# Patient Record
Sex: FEMALE | Race: WHITE | NOT HISPANIC OR LATINO | Employment: UNEMPLOYED | URBAN - METROPOLITAN AREA
[De-identification: names, ages, dates, MRNs, and addresses within clinical notes are randomized per-mention and may not be internally consistent; named-entity substitution may affect disease eponyms.]

---

## 2019-11-02 ENCOUNTER — HOSPITAL ENCOUNTER (EMERGENCY)
Facility: HOSPITAL | Age: 29
Discharge: HOME | End: 2019-11-02
Attending: EMERGENCY MEDICINE
Payer: MEDICAID

## 2019-11-02 VITALS
HEIGHT: 62 IN | SYSTOLIC BLOOD PRESSURE: 99 MMHG | RESPIRATION RATE: 18 BRPM | TEMPERATURE: 99 F | HEART RATE: 91 BPM | OXYGEN SATURATION: 99 % | WEIGHT: 147 LBS | BODY MASS INDEX: 27.05 KG/M2 | DIASTOLIC BLOOD PRESSURE: 64 MMHG

## 2019-11-02 DIAGNOSIS — S90.31XA CONTUSION OF RIGHT FOOT, INITIAL ENCOUNTER: Primary | ICD-10-CM

## 2019-11-02 DIAGNOSIS — R52 PAIN: ICD-10-CM

## 2019-11-02 DIAGNOSIS — S93.401A SPRAIN OF RIGHT ANKLE, UNSPECIFIED LIGAMENT, INITIAL ENCOUNTER: ICD-10-CM

## 2019-11-02 LAB
B-HCG UR QL: NEGATIVE
CTP QC/QA: YES

## 2019-11-02 PROCEDURE — 81025 URINE PREGNANCY TEST: CPT | Mod: ER | Performed by: EMERGENCY MEDICINE

## 2019-11-02 PROCEDURE — 25000003 PHARM REV CODE 250: Mod: ER | Performed by: EMERGENCY MEDICINE

## 2019-11-02 PROCEDURE — 99284 EMERGENCY DEPT VISIT MOD MDM: CPT | Mod: 25,ER

## 2019-11-02 RX ORDER — IBUPROFEN 600 MG/1
600 TABLET ORAL EVERY 6 HOURS PRN
Qty: 24 TABLET | Refills: 0 | Status: ON HOLD | OUTPATIENT
Start: 2019-11-02 | End: 2020-06-22

## 2019-11-02 RX ORDER — ACETAMINOPHEN 325 MG/1
650 TABLET ORAL
Status: COMPLETED | OUTPATIENT
Start: 2019-11-02 | End: 2019-11-02

## 2019-11-02 RX ADMIN — ACETAMINOPHEN 650 MG: 325 TABLET, FILM COATED ORAL at 11:11
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
## 2020-06-21 ENCOUNTER — HOSPITAL ENCOUNTER (EMERGENCY)
Facility: HOSPITAL | Age: 30
Discharge: PSYCHIATRIC OR SUBSTANCE USE FACILITY | End: 2020-06-21
Attending: EMERGENCY MEDICINE
Payer: MEDICAID

## 2020-06-21 ENCOUNTER — HOSPITAL ENCOUNTER (INPATIENT)
Facility: HOSPITAL | Age: 30
LOS: 3 days | Discharge: HOME | DRG: 885 | End: 2020-06-24
Attending: PSYCHIATRY & NEUROLOGY | Admitting: PSYCHIATRY & NEUROLOGY
Payer: MEDICAID

## 2020-06-21 VITALS
SYSTOLIC BLOOD PRESSURE: 130 MMHG | OXYGEN SATURATION: 100 % | BODY MASS INDEX: 27.6 KG/M2 | HEIGHT: 62 IN | TEMPERATURE: 98 F | HEART RATE: 100 BPM | DIASTOLIC BLOOD PRESSURE: 64 MMHG | WEIGHT: 150 LBS | RESPIRATION RATE: 17 BRPM

## 2020-06-21 DIAGNOSIS — F32.A DEPRESSION WITH SUICIDAL IDEATION: ICD-10-CM

## 2020-06-21 DIAGNOSIS — F43.10 PTSD (POST-TRAUMATIC STRESS DISORDER): ICD-10-CM

## 2020-06-21 DIAGNOSIS — Z72.89 DELIBERATE SELF-CUTTING: ICD-10-CM

## 2020-06-21 DIAGNOSIS — F33.2 MDD (MAJOR DEPRESSIVE DISORDER), RECURRENT SEVERE, WITHOUT PSYCHOSIS: Primary | ICD-10-CM

## 2020-06-21 DIAGNOSIS — R45.851 DEPRESSION WITH SUICIDAL IDEATION: ICD-10-CM

## 2020-06-21 DIAGNOSIS — T14.8XXA ABRASION: ICD-10-CM

## 2020-06-21 LAB
ALBUMIN SERPL BCP-MCNC: 4.3 G/DL (ref 3.5–5.2)
ALP SERPL-CCNC: 71 U/L (ref 55–135)
ALT SERPL W/O P-5'-P-CCNC: 14 U/L (ref 10–44)
AMPHET+METHAMPHET UR QL: NEGATIVE
ANION GAP SERPL CALC-SCNC: 11 MMOL/L (ref 8–16)
APAP SERPL-MCNC: <3 UG/ML (ref 10–20)
AST SERPL-CCNC: 18 U/L (ref 10–40)
B-HCG UR QL: NEGATIVE
BARBITURATES UR QL SCN>200 NG/ML: NEGATIVE
BASOPHILS # BLD AUTO: 0.02 K/UL (ref 0–0.2)
BASOPHILS NFR BLD: 0.2 % (ref 0–1.9)
BENZODIAZ UR QL SCN>200 NG/ML: NEGATIVE
BILIRUB SERPL-MCNC: 0.4 MG/DL (ref 0.1–1)
BILIRUB UR QL STRIP: NEGATIVE
BUN SERPL-MCNC: 17 MG/DL (ref 6–20)
BZE UR QL SCN: NEGATIVE
CALCIUM SERPL-MCNC: 9.7 MG/DL (ref 8.7–10.5)
CANNABINOIDS UR QL SCN: NEGATIVE
CHLORIDE SERPL-SCNC: 107 MMOL/L (ref 95–110)
CLARITY UR: ABNORMAL
CO2 SERPL-SCNC: 23 MMOL/L (ref 23–29)
COLOR UR: YELLOW
CREAT SERPL-MCNC: 0.7 MG/DL (ref 0.5–1.4)
CREAT UR-MCNC: 167.9 MG/DL (ref 15–325)
CTP QC/QA: YES
DIFFERENTIAL METHOD: ABNORMAL
EOSINOPHIL # BLD AUTO: 0 K/UL (ref 0–0.5)
EOSINOPHIL NFR BLD: 0.1 % (ref 0–8)
ERYTHROCYTE [DISTWIDTH] IN BLOOD BY AUTOMATED COUNT: 13.2 % (ref 11.5–14.5)
EST. GFR  (AFRICAN AMERICAN): >60 ML/MIN/1.73 M^2
EST. GFR  (NON AFRICAN AMERICAN): >60 ML/MIN/1.73 M^2
ETHANOL SERPL-MCNC: <10 MG/DL
GLUCOSE SERPL-MCNC: 91 MG/DL (ref 70–110)
GLUCOSE UR QL STRIP: NEGATIVE
HCT VFR BLD AUTO: 42.5 % (ref 37–48.5)
HGB BLD-MCNC: 13.7 G/DL (ref 12–16)
HGB UR QL STRIP: ABNORMAL
IMM GRANULOCYTES # BLD AUTO: 0.04 K/UL (ref 0–0.04)
IMM GRANULOCYTES NFR BLD AUTO: 0.4 % (ref 0–0.5)
KETONES UR QL STRIP: NEGATIVE
LEUKOCYTE ESTERASE UR QL STRIP: NEGATIVE
LYMPHOCYTES # BLD AUTO: 0.7 K/UL (ref 1–4.8)
LYMPHOCYTES NFR BLD: 7.7 % (ref 18–48)
MCH RBC QN AUTO: 29.5 PG (ref 27–31)
MCHC RBC AUTO-ENTMCNC: 32.2 G/DL (ref 32–36)
MCV RBC AUTO: 92 FL (ref 82–98)
METHADONE UR QL SCN>300 NG/ML: NEGATIVE
MICROSCOPIC COMMENT: ABNORMAL
MONOCYTES # BLD AUTO: 0.6 K/UL (ref 0.3–1)
MONOCYTES NFR BLD: 6.1 % (ref 4–15)
NEUTROPHILS # BLD AUTO: 7.9 K/UL (ref 1.8–7.7)
NEUTROPHILS NFR BLD: 85.5 % (ref 38–73)
NITRITE UR QL STRIP: NEGATIVE
NRBC BLD-RTO: 0 /100 WBC
OPIATES UR QL SCN: NEGATIVE
PCP UR QL SCN>25 NG/ML: NEGATIVE
PH UR STRIP: 6 [PH] (ref 5–8)
PLATELET # BLD AUTO: 206 K/UL (ref 150–350)
PMV BLD AUTO: 10.8 FL (ref 9.2–12.9)
POTASSIUM SERPL-SCNC: 4.3 MMOL/L (ref 3.5–5.1)
PROT SERPL-MCNC: 7.8 G/DL (ref 6–8.4)
PROT UR QL STRIP: NEGATIVE
RBC # BLD AUTO: 4.64 M/UL (ref 4–5.4)
RBC #/AREA URNS HPF: 5 /HPF (ref 0–4)
SARS-COV-2 RDRP RESP QL NAA+PROBE: NEGATIVE
SODIUM SERPL-SCNC: 141 MMOL/L (ref 136–145)
SP GR UR STRIP: >=1.03 (ref 1–1.03)
TOXICOLOGY INFORMATION: NORMAL
TSH SERPL DL<=0.005 MIU/L-ACNC: 1.05 UIU/ML (ref 0.4–4)
URN SPEC COLLECT METH UR: ABNORMAL
UROBILINOGEN UR STRIP-ACNC: NEGATIVE EU/DL
WBC # BLD AUTO: 9.19 K/UL (ref 3.9–12.7)
WBC #/AREA URNS HPF: 8 /HPF (ref 0–5)

## 2020-06-21 PROCEDURE — 25000003 PHARM REV CODE 250: Performed by: PSYCHIATRY & NEUROLOGY

## 2020-06-21 PROCEDURE — 99202 PR OFFICE/OUTPT VISIT, NEW, LEVL II, 15-29 MIN: ICD-10-PCS | Mod: 95,,, | Performed by: NURSE PRACTITIONER

## 2020-06-21 PROCEDURE — 84443 ASSAY THYROID STIM HORMONE: CPT

## 2020-06-21 PROCEDURE — 80307 DRUG TEST PRSMV CHEM ANLYZR: CPT

## 2020-06-21 PROCEDURE — 99285 EMERGENCY DEPT VISIT HI MDM: CPT | Mod: 25

## 2020-06-21 PROCEDURE — 85025 COMPLETE CBC W/AUTO DIFF WBC: CPT

## 2020-06-21 PROCEDURE — 11400000 HC PSYCH PRIVATE ROOM

## 2020-06-21 PROCEDURE — 25000003 PHARM REV CODE 250: Performed by: EMERGENCY MEDICINE

## 2020-06-21 PROCEDURE — 80053 COMPREHEN METABOLIC PANEL: CPT

## 2020-06-21 PROCEDURE — 80329 ANALGESICS NON-OPIOID 1 OR 2: CPT

## 2020-06-21 PROCEDURE — U0002 COVID-19 LAB TEST NON-CDC: HCPCS

## 2020-06-21 PROCEDURE — 81025 URINE PREGNANCY TEST: CPT | Performed by: EMERGENCY MEDICINE

## 2020-06-21 PROCEDURE — 99202 OFFICE O/P NEW SF 15 MIN: CPT | Mod: 95,,, | Performed by: NURSE PRACTITIONER

## 2020-06-21 PROCEDURE — 80320 DRUG SCREEN QUANTALCOHOLS: CPT

## 2020-06-21 PROCEDURE — 81000 URINALYSIS NONAUTO W/SCOPE: CPT | Mod: 59

## 2020-06-21 RX ORDER — ACETAMINOPHEN 325 MG/1
650 TABLET ORAL EVERY 6 HOURS PRN
Status: DISCONTINUED | OUTPATIENT
Start: 2020-06-21 | End: 2020-06-24 | Stop reason: HOSPADM

## 2020-06-21 RX ORDER — LOPERAMIDE HYDROCHLORIDE 2 MG/1
2 CAPSULE ORAL
Status: DISCONTINUED | OUTPATIENT
Start: 2020-06-21 | End: 2020-06-24 | Stop reason: HOSPADM

## 2020-06-21 RX ORDER — HYDROXYZINE PAMOATE 50 MG/1
50 CAPSULE ORAL NIGHTLY PRN
Status: DISCONTINUED | OUTPATIENT
Start: 2020-06-21 | End: 2020-06-24 | Stop reason: HOSPADM

## 2020-06-21 RX ORDER — OLANZAPINE 10 MG/1
10 TABLET ORAL EVERY 4 HOURS PRN
Status: DISCONTINUED | OUTPATIENT
Start: 2020-06-21 | End: 2020-06-24 | Stop reason: HOSPADM

## 2020-06-21 RX ORDER — MAG HYDROX/ALUMINUM HYD/SIMETH 200-200-20
30 SUSPENSION, ORAL (FINAL DOSE FORM) ORAL EVERY 6 HOURS PRN
Status: DISCONTINUED | OUTPATIENT
Start: 2020-06-21 | End: 2020-06-24 | Stop reason: HOSPADM

## 2020-06-21 RX ORDER — OLANZAPINE 10 MG/2ML
10 INJECTION, POWDER, FOR SOLUTION INTRAMUSCULAR EVERY 4 HOURS PRN
Status: DISCONTINUED | OUTPATIENT
Start: 2020-06-21 | End: 2020-06-24 | Stop reason: HOSPADM

## 2020-06-21 RX ORDER — FOLIC ACID 1 MG/1
1 TABLET ORAL DAILY
Status: DISCONTINUED | OUTPATIENT
Start: 2020-06-22 | End: 2020-06-24 | Stop reason: HOSPADM

## 2020-06-21 RX ORDER — DOCUSATE SODIUM 100 MG/1
100 CAPSULE, LIQUID FILLED ORAL DAILY PRN
Status: DISCONTINUED | OUTPATIENT
Start: 2020-06-21 | End: 2020-06-24 | Stop reason: HOSPADM

## 2020-06-21 RX ADMIN — HYDROXYZINE PAMOATE 50 MG: 50 CAPSULE ORAL at 10:06

## 2020-06-21 RX ADMIN — NEOMYCIN-BACITRACIN-POLYMYXIN OINT 1 EACH: OINTMENT at 06:06

## 2020-06-21 RX ADMIN — ACETAMINOPHEN 650 MG: 325 TABLET ORAL at 10:06

## 2020-06-21 NOTE — ED NOTES
Pt refuses diptheria-tetanus injection; states she believes she has had one in the last 5 years. Medication returned in pyxis.
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
## 2020-06-21 NOTE — ED NOTES
Security at bedside to wand patient. Pt changed into paper scrubs. Belongings secured and labeled by SHARITA Adam.

## 2020-06-21 NOTE — ED NOTES
Consult with Dr. Christofer jimenez. Dr. Beaulieu states he feels the patient can be discharged home, as long as her boyfriend is ok with this discharge plan. Pt gave Dr. Beaulieu her boyfriend's phone number to call.

## 2020-06-21 NOTE — ED NOTES
Pt informed that PEC has been written and the providers say that she needs to stay for inpatient treatment. Pt upset, says she doesn't need to stay. Dr. Christianson now at bedside discussing this plan with patient again.

## 2020-06-22 PROBLEM — F43.10 PTSD (POST-TRAUMATIC STRESS DISORDER): Status: ACTIVE | Noted: 2020-06-22

## 2020-06-22 PROBLEM — F33.2 MDD (MAJOR DEPRESSIVE DISORDER), RECURRENT SEVERE, WITHOUT PSYCHOSIS: Status: ACTIVE | Noted: 2020-06-22

## 2020-06-22 PROBLEM — T14.8XXA ABRASION: Status: ACTIVE | Noted: 2020-06-22

## 2020-06-22 LAB
CHOLEST SERPL-MCNC: 184 MG/DL (ref 120–199)
CHOLEST/HDLC SERPL: 3 {RATIO} (ref 2–5)
ESTIMATED AVG GLUCOSE: 85 MG/DL (ref 68–131)
HBA1C MFR BLD HPLC: 4.6 % (ref 4–5.6)
HDLC SERPL-MCNC: 61 MG/DL (ref 40–75)
HDLC SERPL: 33.2 % (ref 20–50)
LDLC SERPL CALC-MCNC: 113.4 MG/DL (ref 63–159)
NONHDLC SERPL-MCNC: 123 MG/DL
TRIGL SERPL-MCNC: 48 MG/DL (ref 30–150)

## 2020-06-22 PROCEDURE — 90833 PSYTX W PT W E/M 30 MIN: CPT | Mod: ,,, | Performed by: PSYCHIATRY & NEUROLOGY

## 2020-06-22 PROCEDURE — 99231 SBSQ HOSP IP/OBS SF/LOW 25: CPT | Mod: ,,, | Performed by: NURSE PRACTITIONER

## 2020-06-22 PROCEDURE — 99223 PR INITIAL HOSPITAL CARE,LEVL III: ICD-10-PCS | Mod: ,,, | Performed by: PSYCHIATRY & NEUROLOGY

## 2020-06-22 PROCEDURE — 80061 LIPID PANEL: CPT

## 2020-06-22 PROCEDURE — 99223 1ST HOSP IP/OBS HIGH 75: CPT | Mod: ,,, | Performed by: PSYCHIATRY & NEUROLOGY

## 2020-06-22 PROCEDURE — 36415 COLL VENOUS BLD VENIPUNCTURE: CPT

## 2020-06-22 PROCEDURE — 90833 PR PSYCHOTHERAPY W/PATIENT W/E&M, 30 MIN (ADD ON): ICD-10-PCS | Mod: ,,, | Performed by: PSYCHIATRY & NEUROLOGY

## 2020-06-22 PROCEDURE — 11400000 HC PSYCH PRIVATE ROOM

## 2020-06-22 PROCEDURE — 83036 HEMOGLOBIN GLYCOSYLATED A1C: CPT

## 2020-06-22 PROCEDURE — 99231 PR SUBSEQUENT HOSPITAL CARE,LEVL I: ICD-10-PCS | Mod: ,,, | Performed by: NURSE PRACTITIONER

## 2020-06-22 PROCEDURE — 25000003 PHARM REV CODE 250: Performed by: PSYCHIATRY & NEUROLOGY

## 2020-06-22 RX ORDER — SERTRALINE HYDROCHLORIDE 25 MG/1
25 TABLET, FILM COATED ORAL DAILY
Status: DISCONTINUED | OUTPATIENT
Start: 2020-06-22 | End: 2020-06-23

## 2020-06-22 RX ADMIN — THERA TABS 1 TABLET: TAB at 08:06

## 2020-06-22 RX ADMIN — SERTRALINE HYDROCHLORIDE 25 MG: 25 TABLET ORAL at 11:06

## 2020-06-22 RX ADMIN — FOLIC ACID 1 MG: 1 TABLET ORAL at 08:06

## 2020-06-22 NOTE — ED NOTES
Pt tearful and asking about being discharged home to her children's father's home. Pt advised that the decision has been made as far as the PEC. Explained to her that the request for acceptance has been made and I will update her with new updates as they are available.  Provided the pt with spectralink to call her children again.
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
## 2020-06-22 NOTE — PLAN OF CARE
Pt out on unit Pt reports fair sleep last night.Pt refused breakfast.Hygiene and grooming adequate.Mood sad but denies suicidal thoughts.Attended and participated in Tx Team.Participating in groups.Cooperative.
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
## 2020-06-22 NOTE — CONSULTS
"  [facility]  Adult Nutrition  Consult Note    SUMMARY     Recommendations    Recommendation:   1. Continue regular diet order   2. Encourage pt to increase intake of meals to meet nutritional needs    Interventions:  General Healthful Diet    Goals: PO intake of meals to increase to at least 50% by RD f/u  Nutrition Goal Status: new  Communication of RD Recs: (POC)    Reason for Assessment    Reason For Assessment: consult  Diagnosis: psychological disorder  Relevant Medical History: No past medical history on file.    General Information Comments: No signs of significant weight loss at this time. 0% intake of breakfast this AM. NFPE not completed per psych status.    Nutrition Discharge Planning: Regular Diet    Nutrition Risk Screen    Nutrition Risk Screen: no indicators present    Nutrition/Diet History    Patient Reported Diet/Restrictions/Preferences: general  Spiritual, Cultural Beliefs, Zoroastrian Practices, Values that Affect Care: no  Food Allergies: NKFA  Factors Affecting Nutritional Intake: (patient's current emotional state)    Anthropometrics    Temp: 96.6 °F (35.9 °C)  Height Method: Stated  Height: 5' 2" (157.5 cm)  Height (inches): 62 in  Weight Method: Standard Scale  Weight: 71.5 kg (157 lb 10.1 oz)  Weight (lb): 157.63 lb  Ideal Body Weight (IBW), Female: 110 lb  % Ideal Body Weight, Female (lb): 143.3 %  BMI (Calculated): 28.8  BMI Grade: 25 - 29.9 - overweight     Lab/Procedures/Meds    Pertinent Labs Reviewed: reviewed  Pertinent Labs Comments: WNL  Pertinent Medications Reviewed: reviewed  Pertinent Medications Comments: Folic Acid, MVI    Estimated/Assessed Needs    Weight Used For Calorie Calculations: 71.5 kg (157 lb 10.1 oz)  Energy Calorie Requirements (kcal): 1825  Energy Need Method: [place](*1.3)  Protein Requirements: 57-71 g(.8-1 g/kg)  Weight Used For Protein Calculations: 71.5 kg (157 lb 10.1 oz)     Estimated Fluid Requirement Method: RDA Method  RDA " Method (mL): 1825     Nutrition Prescription Ordered    Current Diet Order: Regular Diet    Evaluation of Received Nutrient/Fluid Intake       % Intake of Estimated Energy Needs: 0 - 25 %  % Meal Intake: 0 - 25 %    Nutrition Risk    Level of Risk/Frequency of Follow-up: low - moderate(1x/wk)     Assessment and Plan  Nutrition Problem:  Inadequate energy intake    Related to (etiology):   psychosis    Signs and Symptoms (as evidenced by):   Meal intake 0% this AM     Interventions:  General Healthful Diet    Nutrition Diagnosis Status:   New     Monitor and Evaluation    Food and Nutrient Intake: energy intake, food and beverage intake  Anthropometric Measurements: weight, weight change, body mass index  Nutrition-Focused Physical Findings: overall appearance     Malnutrition Assessment  Malnutrition Type: (patient does not meet criteria for malnutrition dx at this time)    Nutrition Follow-Up    RD Follow-up?: Yes
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
## 2020-06-22 NOTE — HPI
30yo female patient with no medical history admitted to [facility] for suicidal ideation. Medicine consulted for H/P. VSS/afebrile. Labs reviewed.
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
## 2020-06-22 NOTE — PLAN OF CARE
Lying quiet in bed, eyes closed, respiration even and unlabored, appearing asleep by midnight.  Slept well all shift.  Safety and precautions maintained with rounds every 15 minutes, bed is fixed in a low position and pathways kept clear.  No fall occurred.
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
## 2020-06-22 NOTE — PLAN OF CARE
Recommendation:   1. Continue regular diet order   2. Encourage pt to increase intake of meals to meet nutritional needs    Interventions:  General Healthful Diet    Goals: PO intake of meals to increase to at least 50% by RD f/u  Nutrition Goal Status: new  Nutrition Discharge Planning: Regular Diet
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
## 2020-06-22 NOTE — CONSULTS
[facility] Medicine  Consult Note    Patient Name: Milagros Galvin  MRN: [id number]  Admission Date: 6/21/2020  Hospital Length of Stay: 1 days  Attending Physician: Ambrocio Zimmerman MD   Primary Care Provider: [facility]           Patient information was obtained from patient and ER records.     Inpatient consult to [facility] for History and Physical  Consult performed by: Patience Greene NP  Consult ordered by: Ambrocio Zimmerman MD        Subjective:     Principal Problem: MDD (major depressive disorder), recurrent severe, without psychosis    Chief Complaint: No chief complaint on file.       HPI: 30yo female patient with no medical history admitted to [facility] for suicidal ideation. Medicine consulted for H/P. VSS/afebrile. Labs reviewed.     Past Medical History:   Diagnosis Date    Anxiety     Depression     Hx of psychiatric care     lexapro prescribed by PCP    Psychiatric problem     Therapy     states was seeing a counselor prior to COVID crisis but could not see with the crisis       No past surgical history on file.    Review of patient's allergies indicates:  No Known Allergies    Current Facility-Administered Medications on File Prior to Encounter   Medication    [COMPLETED] neomycin-bacitracnZn-polymyxnB packet 1 each    [DISCONTINUED] diptheria-tetanus toxoids 2-2 Lf unit/0.5 mL injection 0.5 mL     Current Outpatient Medications on File Prior to Encounter   Medication Sig    ibuprofen (ADVIL,MOTRIN) 600 MG tablet Take 1 tablet (600 mg total) by mouth every 6 (six) hours as needed for Pain.     Family History     Problem Relation (Age of Onset)    Diabetes Father    Heart failure Father        Tobacco Use    Smoking status: Never Smoker    Smokeless tobacco: Never Used   Substance and Sexual Activity    Alcohol use: Yes     Comment: 2-3 weekly or every other week    Drug use: Never    Sexual activity: Yes     Partners: Male      Birth control/protection: None     Review of Systems   Constitutional: Negative for chills, fatigue, fever and unexpected weight change.   HENT: Negative for congestion, ear pain, sore throat and trouble swallowing.    Eyes: Negative for pain and visual disturbance.   Respiratory: Negative for cough, chest tightness and shortness of breath.    Cardiovascular: Negative for chest pain, palpitations and leg swelling.   Gastrointestinal: Negative for abdominal distention, abdominal pain, constipation, diarrhea and vomiting.   Genitourinary: Negative for difficulty urinating, dysuria, flank pain, frequency and hematuria.   Musculoskeletal: Negative for back pain, gait problem, joint swelling, neck pain and neck stiffness.   Skin: Negative for rash and wound.   Neurological: Negative for dizziness, seizures, speech difficulty, light-headedness and headaches.   Psychiatric/Behavioral: Positive for self-injury. Negative for agitation and confusion. The patient is nervous/anxious.      Objective:     Vital Signs (Most Recent):  Temp: 96.6 °F (35.9 °C) (06/22/20 0730)  Pulse: 89 (06/22/20 0730)  Resp: 18 (06/22/20 0730)  BP: (!) 95/59 (06/22/20 0730) Vital Signs (24h Range):  Temp:  [96.6 °F (35.9 °C)-99.8 °F (37.7 °C)] 96.6 °F (35.9 °C)  Pulse:  [] 89  Resp:  [16-18] 18  SpO2:  [100 %] 100 %  BP: ()/(58-83) 95/59     Weight: 71.5 kg (157 lb 10.1 oz)  Body mass index is 28.83 kg/m².    Physical Exam  Vitals signs and nursing note reviewed.   Constitutional:       Appearance: She is well-developed.   HENT:      Head: Normocephalic and atraumatic.   Neck:      Thyroid: No thyromegaly.   Cardiovascular:      Rate and Rhythm: Normal rate and regular rhythm.      Heart sounds: Normal heart sounds. No murmur.   Pulmonary:      Effort: Pulmonary effort is normal. No respiratory distress.      Breath sounds: Normal breath sounds. No wheezing or rales.   Abdominal:      General: Bowel sounds are normal. There is no  distension.      Palpations: Abdomen is soft. There is no mass.      Tenderness: There is no abdominal tenderness.   Musculoskeletal: Normal range of motion.   Lymphadenopathy:      Cervical: No cervical adenopathy.   Skin:     General: Skin is warm and dry.      Findings: No erythema.      Comments: Very superficial abrasion to inside of left wrist. scabbed    Neurological:      Mental Status: She is alert and oriented to person, place, and time.      Comments:   Neuro: Cranial nerves:  CN II Visual fields full to confrontation.   CN III, IV, VI Pupils are equal, round, and reactive to light.  CN III: no palsy  Nystagmus: none   Diplopia: none  Ophthalmoparesis: none  CN V Facial sensation intact.   CN VII Facial expression full, symmetric.   CN VIII normal.   CN IX normal.   CN X normal.   CN XI normal.   CN XII normal.               Significant Labs: covid negative   UPT  Results for orders placed or performed during the hospital encounter of 06/21/20   POCT urine pregnancy   Result Value Ref Range    POC Preg Test, Ur Negative Negative     Acceptable Yes      U/A hazy, 1+ occult blood, 5 RBC, 8 WBC  UDS  Results for orders placed or performed during the hospital encounter of 06/21/20   Drug screen panel, emergency   Result Value Ref Range    Benzodiazepines Negative     Methadone metabolites Negative     Cocaine (Metab.) Negative     Opiate Scrn, Ur Negative     Barbiturate Screen, Ur Negative     Amphetamine Screen, Ur Negative     THC Negative     Phencyclidine Negative     Creatinine, Random Ur 167.9 15.0 - 325.0 mg/dL    Toxicology Information SEE COMMENT      CBC  Results for orders placed or performed during the hospital encounter of 06/21/20   CBC auto differential   Result Value Ref Range    WBC 9.19 3.90 - 12.70 K/uL    RBC 4.64 4.00 - 5.40 M/uL    Hemoglobin 13.7 12.0 - 16.0 g/dL    Hematocrit 42.5 37.0 - 48.5 %    Mean Corpuscular Volume 92 82 - 98 fL    Mean Corpuscular Hemoglobin 29.5  27.0 - 31.0 pg    Mean Corpuscular Hemoglobin Conc 32.2 32.0 - 36.0 g/dL    RDW 13.2 11.5 - 14.5 %    Platelets 206 150 - 350 K/uL    MPV 10.8 9.2 - 12.9 fL    Immature Granulocytes 0.4 0.0 - 0.5 %    Gran # (ANC) 7.9 (H) 1.8 - 7.7 K/uL    Immature Grans (Abs) 0.04 0.00 - 0.04 K/uL    Lymph # 0.7 (L) 1.0 - 4.8 K/uL    Mono # 0.6 0.3 - 1.0 K/uL    Eos # 0.0 0.0 - 0.5 K/uL    Baso # 0.02 0.00 - 0.20 K/uL    nRBC 0 0 /100 WBC    Gran% 85.5 (H) 38.0 - 73.0 %    Lymph% 7.7 (L) 18.0 - 48.0 %    Mono% 6.1 4.0 - 15.0 %    Eosinophil% 0.1 0.0 - 8.0 %    Basophil% 0.2 0.0 - 1.9 %    Differential Method Automated      CMP  Results for orders placed or performed during the hospital encounter of 06/21/20   Comprehensive metabolic panel   Result Value Ref Range    Sodium 141 136 - 145 mmol/L    Potassium 4.3 3.5 - 5.1 mmol/L    Chloride 107 95 - 110 mmol/L    CO2 23 23 - 29 mmol/L    Glucose 91 70 - 110 mg/dL    BUN, Bld 17 6 - 20 mg/dL    Creatinine 0.7 0.5 - 1.4 mg/dL    Calcium 9.7 8.7 - 10.5 mg/dL    Total Protein 7.8 6.0 - 8.4 g/dL    Albumin 4.3 3.5 - 5.2 g/dL    Total Bilirubin 0.4 0.1 - 1.0 mg/dL    Alkaline Phosphatase 71 55 - 135 U/L    AST 18 10 - 40 U/L    ALT 14 10 - 44 U/L    Anion Gap 11 8 - 16 mmol/L    eGFR if African American >60 >60 mL/min/1.73 m^2    eGFR if non African American >60 >60 mL/min/1.73 m^2     TSH  Results for orders placed or performed during the hospital encounter of 06/21/20   TSH   Result Value Ref Range    TSH 1.051 0.400 - 4.000 uIU/mL     ETOH  Results for orders placed or performed during the hospital encounter of 06/21/20   Ethanol   Result Value Ref Range    Alcohol, Medical, Serum <10 <10 mg/dL     Salicylate  No results found for this or any previous visit.  Acetaminophen  Results for orders placed or performed during the hospital encounter of 06/21/20   Acetaminophen level   Result Value Ref Range    Acetaminophen (Tylenol), Serum <3.0 (L) 10.0 - 20.0 ug/mL              Assessment/Plan:     * MDD (major depressive disorder), recurrent severe, without psychosis  Further orders per psych      Abrasion  Very superficial  Keep covered with band aid  UTD with Tdap with last delivery 3 years ago       PTSD (post-traumatic stress disorder)  Further orders per psych      Depression with suicidal ideation  Further orders per psych        VTE Risk Mitigation (From admission, onward)    None              Thank you for your consult. I will sign off. Please contact us if you have any additional questions.    Patience Greene NP  Department of Hospital Medicine   [facility]

## 2020-06-22 NOTE — PROGRESS NOTES
06/22/20 1040   [facility] Group Therapy   Group Name Therapeutic Recreation   Specific Interventions Cognitive Stimulation Training   Participation Level Appropriate   Participation Quality Cooperative   Insight/Motivation Good   Affect/Mood Display Depressed   Cognition Alert   Psychomotor WNL   Patient reports depressed mood and states she felt better during the activity, and had trouble concentrating. Patient was quiet unless approached.

## 2020-06-22 NOTE — ED NOTES
[facility] arrived to transport pt; pt escorted with security to vehicle outside via w/c. Belongings given to SPD along with original PEC.

## 2020-06-23 PROBLEM — R45.851 DEPRESSION WITH SUICIDAL IDEATION: Status: RESOLVED | Noted: 2020-06-21 | Resolved: 2020-06-23

## 2020-06-23 PROBLEM — F32.A DEPRESSION WITH SUICIDAL IDEATION: Status: RESOLVED | Noted: 2020-06-21 | Resolved: 2020-06-23

## 2020-06-23 PROCEDURE — 99233 SBSQ HOSP IP/OBS HIGH 50: CPT | Mod: ,,, | Performed by: PSYCHIATRY & NEUROLOGY

## 2020-06-23 PROCEDURE — 11400000 HC PSYCH PRIVATE ROOM

## 2020-06-23 PROCEDURE — 90833 PSYTX W PT W E/M 30 MIN: CPT | Mod: ,,, | Performed by: PSYCHIATRY & NEUROLOGY

## 2020-06-23 PROCEDURE — 99233 PR SUBSEQUENT HOSPITAL CARE,LEVL III: ICD-10-PCS | Mod: ,,, | Performed by: PSYCHIATRY & NEUROLOGY

## 2020-06-23 PROCEDURE — 90833 PR PSYCHOTHERAPY W/PATIENT W/E&M, 30 MIN (ADD ON): ICD-10-PCS | Mod: ,,, | Performed by: PSYCHIATRY & NEUROLOGY

## 2020-06-23 PROCEDURE — 25000003 PHARM REV CODE 250: Performed by: PSYCHIATRY & NEUROLOGY

## 2020-06-23 RX ORDER — SERTRALINE HYDROCHLORIDE 50 MG/1
50 TABLET, FILM COATED ORAL DAILY
Qty: 30 TABLET | Refills: 2 | Status: SHIPPED | OUTPATIENT
Start: 2020-06-24 | End: 2021-06-24

## 2020-06-23 RX ORDER — SERTRALINE HYDROCHLORIDE 50 MG/1
50 TABLET, FILM COATED ORAL DAILY
Status: DISCONTINUED | OUTPATIENT
Start: 2020-06-24 | End: 2020-06-24 | Stop reason: HOSPADM

## 2020-06-23 RX ORDER — HYDROXYZINE PAMOATE 50 MG/1
50 CAPSULE ORAL NIGHTLY PRN
Qty: 30 CAPSULE | Refills: 2 | Status: SHIPPED | OUTPATIENT
Start: 2020-06-23

## 2020-06-23 RX ADMIN — THERA TABS 1 TABLET: TAB at 09:06

## 2020-06-23 RX ADMIN — FOLIC ACID 1 MG: 1 TABLET ORAL at 09:06

## 2020-06-23 RX ADMIN — SERTRALINE HYDROCHLORIDE 25 MG: 25 TABLET ORAL at 09:06

## 2020-06-23 RX ADMIN — HYDROXYZINE PAMOATE 50 MG: 50 CAPSULE ORAL at 08:06
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
## 2020-06-23 NOTE — PLAN OF CARE
Pt out on unit.Pt reports poor sleep last night.Pt blames poor sleep on being in hospital.Appetite poor and ate 25% breakfast.Hygiene and grooming adequate.Mood depressed.Focused on going home.Medication compliant.
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
## 2020-06-23 NOTE — PLAN OF CARE
Out and about in unit Interaction with staff and peers. Compliant with unit routine.Left wrist scratches dry without redness. Denies suicidal thoughts. States feels better today then yesterday. Safety measures maintained. Will continue to monitor.
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
## 2020-06-24 VITALS
HEIGHT: 62 IN | DIASTOLIC BLOOD PRESSURE: 61 MMHG | SYSTOLIC BLOOD PRESSURE: 105 MMHG | WEIGHT: 156.44 LBS | RESPIRATION RATE: 16 BRPM | TEMPERATURE: 97 F | HEART RATE: 87 BPM | BODY MASS INDEX: 28.79 KG/M2

## 2020-06-24 PROCEDURE — 25000003 PHARM REV CODE 250: Performed by: PSYCHIATRY & NEUROLOGY

## 2020-06-24 PROCEDURE — 99239 PR HOSPITAL DISCHARGE DAY,>30 MIN: ICD-10-PCS | Mod: ,,, | Performed by: PSYCHIATRY & NEUROLOGY

## 2020-06-24 PROCEDURE — 99239 HOSP IP/OBS DSCHRG MGMT >30: CPT | Mod: ,,, | Performed by: PSYCHIATRY & NEUROLOGY

## 2020-06-24 RX ADMIN — THERA TABS 1 TABLET: TAB at 08:06

## 2020-06-24 RX ADMIN — SERTRALINE HYDROCHLORIDE 50 MG: 50 TABLET ORAL at 08:06

## 2020-06-24 RX ADMIN — FOLIC ACID 1 MG: 1 TABLET ORAL at 08:06

## 2020-06-24 NOTE — NURSING
Pt has slept 7 hours. NAD. Resp even and unlabored. Pathways clear and bed in low position. Q15 minute safety checks ongoing. All precautions maintained.

## 2020-06-24 NOTE — PSYCH
The patient will follow up with [facility], [address] [place] [address] [address], [facility] [phone]. Appointment is scheduled on, at. The patient has a negative UDS and is not a smoker. AVS  Faxed on at.

## 2020-06-24 NOTE — PLAN OF CARE
No scheduled PM medication. PRN given for insomnia. Interacting well with staff and peers. Improved mood. Focused on discharged, Abrasion to left wrist dry without redness. Denies SI, HI and AVH. Will continue to monitor for safety.

## 2020-06-24 NOTE — PROGRESS NOTES
"   06/24/20 1040   [facility] Group Therapy   Group Name Therapeutic Recreation   Specific Interventions Cognitive Stimulation Training   Participation Level Appropriate;Attentive   Participation Quality Cooperative   Insight/Motivation Applies New Skills;Good   Affect/Mood Display Appropriate   Cognition Alert   Psychomotor WNL   Patient reports "good" mood and states "the medicine helps and having time to clear my mind."  "

## 2020-06-24 NOTE — PSYCH
Order for discharge received.Discharge instructions explained to pt and voiced a understanding.Calm,cooperative,no si/hi,and stable.Pt will provide rise home.
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
## 2020-06-30 ENCOUNTER — OFFICE VISIT (OUTPATIENT)
Dept: URGENT CARE | Facility: CLINIC | Age: 30
End: 2020-06-30
Payer: MEDICAID

## 2020-06-30 VITALS
TEMPERATURE: 97 F | HEART RATE: 97 BPM | RESPIRATION RATE: 18 BRPM | DIASTOLIC BLOOD PRESSURE: 70 MMHG | BODY MASS INDEX: 28.53 KG/M2 | WEIGHT: 156 LBS | OXYGEN SATURATION: 98 % | SYSTOLIC BLOOD PRESSURE: 115 MMHG

## 2020-06-30 DIAGNOSIS — R30.0 DYSURIA: ICD-10-CM

## 2020-06-30 DIAGNOSIS — N39.0 URINARY TRACT INFECTION WITH HEMATURIA, SITE UNSPECIFIED: Primary | ICD-10-CM

## 2020-06-30 DIAGNOSIS — R31.9 URINARY TRACT INFECTION WITH HEMATURIA, SITE UNSPECIFIED: Primary | ICD-10-CM

## 2020-06-30 LAB
B-HCG UR QL: NEGATIVE
BILIRUB UR QL STRIP: NEGATIVE
CTP QC/QA: YES
GLUCOSE UR QL STRIP: NEGATIVE
KETONES UR QL STRIP: NEGATIVE
LEUKOCYTE ESTERASE UR QL STRIP: POSITIVE
PH, POC UA: 8 (ref 5–8)
POC BLOOD, URINE: POSITIVE
POC NITRATES, URINE: NEGATIVE
PROT UR QL STRIP: POSITIVE
SP GR UR STRIP: 1.01 (ref 1–1.03)
UROBILINOGEN UR STRIP-ACNC: 1 (ref 0.1–1.1)

## 2020-06-30 PROCEDURE — 81003 POCT URINALYSIS, DIPSTICK, AUTOMATED, W/O SCOPE: ICD-10-PCS | Mod: QW,S$GLB,, | Performed by: NURSE PRACTITIONER

## 2020-06-30 PROCEDURE — 81003 URINALYSIS AUTO W/O SCOPE: CPT | Mod: QW,S$GLB,, | Performed by: NURSE PRACTITIONER

## 2020-06-30 PROCEDURE — 99203 OFFICE O/P NEW LOW 30 MIN: CPT | Mod: 25,S$GLB,, | Performed by: NURSE PRACTITIONER

## 2020-06-30 PROCEDURE — 99203 PR OFFICE/OUTPT VISIT, NEW, LEVL III, 30-44 MIN: ICD-10-PCS | Mod: 25,S$GLB,, | Performed by: NURSE PRACTITIONER

## 2020-06-30 RX ORDER — NITROFURANTOIN 25; 75 MG/1; MG/1
100 CAPSULE ORAL 2 TIMES DAILY
Qty: 10 CAPSULE | Refills: 0 | Status: SHIPPED | OUTPATIENT
Start: 2020-06-30 | End: 2020-07-05

## 2020-06-30 RX ORDER — PHENAZOPYRIDINE HYDROCHLORIDE 200 MG/1
200 TABLET, FILM COATED ORAL 3 TIMES DAILY PRN
Qty: 6 TABLET | Refills: 0 | Status: SHIPPED | OUTPATIENT
Start: 2020-06-30 | End: 2020-07-02

## 2020-07-01 NOTE — PATIENT INSTRUCTIONS
"*Urinary Tract Infections*  1) Avoid tub baths.  2) Always urinate after intercourse(Teens/Adults).  3) Avoid "Fizzy" drinks/soda drinks.  4) Always wipe from front to back.  5) Wear only cotton underwear.  6) Drink a lot of fluids (at least 8-10 glasses of water) for 5 to 7 days to help flush your kidneys. You can also drink 1 shot-sized glass of cranberry juice 3X daily over the next several days to help cleanse your bladder, but studies show that cranberry juice does not cure or prevent a UTI.   7) Take all medications as directed. Make sure to complete all antibiotics as prescribed.    8) For patients above 6 months of age who are not allergic to and are not on anticoagulants, you can alternate Tylenol and Motrin every 4-6 hours for fever above 100.4F and/or pain.  For patients less than 6 months of age, allergic to or intolerant to NSAIDS, have gastritis, gastric ulcers, or history of GI bleeds, are pregnant, or are on anticoagulant therapy, you can take Tylenol every 4 hours as needed for fever above 100.4F and/or pain.   9) You should schedule a follow-up appointment with your Primary Care Provider/Pediatrician for recheck in 2-3 days or as directed at this visit.   10) If your condition fails to improve in a timely manner, you should receive another evaluation by your Primary Care Provider/Pediatrician to discuss your concerns or return to urgent care for a recheck.  If your condition worsens at any time, you should report immediately to your nearest Emergency Department for further evaluation. **You must understand that you have received Urgent Care treatment only and that you may be released before all of your medical problems are known or treated. You, the patient, are responsible to arrange for follow-up care as instructed.           "

## 2020-07-01 NOTE — PROGRESS NOTES
Subjective:       Patient ID: Milagros Galvin is a 29 y.o. female.    Vitals:  weight is 70.8 kg (156 lb). Her oral temperature is 97.2 °F (36.2 °C). Her blood pressure is 115/70 and her pulse is 97. Her respiration is 18 and oxygen saturation is 98%.     Chief Complaint: Dysuria    Dysuria   This is a new problem. The current episode started yesterday. The problem occurs every urination. The problem has been gradually worsening. The quality of the pain is described as aching and burning. The pain is at a severity of 8/10. The pain is moderate. There has been no fever. Associated symptoms include frequency and urgency. Pertinent negatives include no chills, flank pain, hematuria, nausea, vomiting or rash. She has tried nothing for the symptoms.       Constitution: Negative for chills and fever.   Neck: Negative for painful lymph nodes.   Gastrointestinal: Negative for abdominal pain, nausea and vomiting.   Genitourinary: Positive for dysuria, frequency and urgency. Negative for urine decreased, flank pain, hematuria, history of kidney stones, painful menstruation, irregular menstruation, missed menses, heavy menstrual bleeding, ovarian cysts, genital trauma, vaginal pain, vaginal discharge, vaginal bleeding, vaginal odor, painful intercourse, genital sore, painful ejaculation and pelvic pain.   Musculoskeletal: Negative for back pain.   Skin: Negative for rash and lesion.   Hematologic/Lymphatic: Negative for swollen lymph nodes.       Objective:      Physical Exam   Constitutional: She is oriented to person, place, and time. She appears well-developed.   HENT:   Head: Normocephalic and atraumatic.   Right Ear: External ear normal.   Left Ear: External ear normal.   Nose: Nose normal. No nasal deformity. No epistaxis.   Mouth/Throat: Oropharynx is clear and moist and mucous membranes are normal.   Eyes: Lids are normal.   Neck: Trachea normal, normal range of motion and phonation normal. Neck supple.    Cardiovascular: Normal pulses.   Pulmonary/Chest: Effort normal. No accessory muscle usage. No respiratory distress.   Abdominal: Soft. Normal appearance and bowel sounds are normal. She exhibits no distension. There is no abdominal tenderness. There is no tenderness at McBurney's point and negative Paredes's sign.   Musculoskeletal:      Right lower leg: No edema.      Left lower leg: No edema.   Neurological: She is alert and oriented to person, place, and time.   Skin: Skin is warm, dry and intact.   Psychiatric: Her speech is normal and behavior is normal.   Nursing note and vitals reviewed.        Assessment:       1. Urinary tract infection with hematuria, site unspecified    2. Dysuria        Plan:         Urinary tract infection with hematuria, site unspecified  -     nitrofurantoin, macrocrystal-monohydrate, (MACROBID) 100 MG capsule; Take 1 capsule (100 mg total) by mouth 2 (two) times daily. for 5 days  Dispense: 10 capsule; Refill: 0  -     phenazopyridine (PYRIDIUM) 200 MG tablet; Take 1 tablet (200 mg total) by mouth 3 (three) times daily as needed for Pain.  Dispense: 6 tablet; Refill: 0    Dysuria  -     POCT Urinalysis, Dipstick, Automated, W/O Scope  -     POCT urine pregnancy          Results for orders placed or performed in visit on 06/30/20   POCT Urinalysis, Dipstick, Automated, W/O Scope   Result Value Ref Range    POC Blood, Urine Positive (A) Negative    POC Bilirubin, Urine Negative Negative    POC Urobilinogen, Urine 1.0 0.1 - 1.1    POC Ketones, Urine Negative Negative    POC Protein, Urine Positive (A) Negative    POC Nitrates, Urine Negative Negative    POC Glucose, Urine Negative Negative    pH, UA 8.0 5 - 8    POC Specific Gravity, Urine 1.015 1.003 - 1.029    POC Leukocytes, Urine Positive (A) Negative   POCT urine pregnancy   Result Value Ref Range    POC Preg Test, Ur Negative Negative     Acceptable Yes          Patient Instructions   *Urinary Tract  "Infections*  1) Avoid tub baths.  2) Always urinate after intercourse(Teens/Adults).  3) Avoid "Fizzy" drinks/soda drinks.  4) Always wipe from front to back.  5) Wear only cotton underwear.  6) Drink a lot of fluids (at least 8-10 glasses of water) for 5 to 7 days to help flush your kidneys. You can also drink 1 shot-sized glass of cranberry juice 3X daily over the next several days to help cleanse your bladder, but studies show that cranberry juice does not cure or prevent a UTI.   7) Take all medications as directed. Make sure to complete all antibiotics as prescribed.    8) For patients above 6 months of age who are not allergic to and are not on anticoagulants, you can alternate Tylenol and Motrin every 4-6 hours for fever above 100.4F and/or pain.  For patients less than 6 months of age, allergic to or intolerant to NSAIDS, have gastritis, gastric ulcers, or history of GI bleeds, are pregnant, or are on anticoagulant therapy, you can take Tylenol every 4 hours as needed for fever above 100.4F and/or pain.   9) You should schedule a follow-up appointment with your Primary Care Provider/Pediatrician for recheck in 2-3 days or as directed at this visit.   10) If your condition fails to improve in a timely manner, you should receive another evaluation by your Primary Care Provider/Pediatrician to discuss your concerns or return to urgent care for a recheck.  If your condition worsens at any time, you should report immediately to your nearest Emergency Department for further evaluation. **You must understand that you have received Urgent Care treatment only and that you may be released before all of your medical problems are known or treated. You, the patient, are responsible to arrange for follow-up care as instructed.                 "

## 2021-10-13 ENCOUNTER — IMMUNIZATION (OUTPATIENT)
Dept: PRIMARY CARE CLINIC | Facility: CLINIC | Age: 31
End: 2021-10-13
Payer: MEDICAID

## 2021-10-13 DIAGNOSIS — Z23 NEED FOR VACCINATION: Primary | ICD-10-CM

## 2021-10-13 PROCEDURE — 0011A COVID-19, MRNA, LNP-S, PF, 100 MCG/0.5 ML DOSE VACCINE: CPT | Mod: CV19,PBBFAC | Performed by: INTERNAL MEDICINE

## 2024-05-21 ENCOUNTER — OFFICE VISIT (OUTPATIENT)
Dept: URGENT CARE | Facility: CLINIC | Age: 34
End: 2024-05-21
Payer: MEDICAID

## 2024-05-21 VITALS
RESPIRATION RATE: 19 BRPM | HEIGHT: 62 IN | SYSTOLIC BLOOD PRESSURE: 109 MMHG | TEMPERATURE: 98 F | WEIGHT: 207 LBS | HEART RATE: 85 BPM | DIASTOLIC BLOOD PRESSURE: 73 MMHG | OXYGEN SATURATION: 99 % | BODY MASS INDEX: 38.09 KG/M2

## 2024-05-21 DIAGNOSIS — O26.891 DYSURIA DURING PREGNANCY IN FIRST TRIMESTER: Primary | ICD-10-CM

## 2024-05-21 DIAGNOSIS — R30.0 DYSURIA DURING PREGNANCY IN FIRST TRIMESTER: Primary | ICD-10-CM

## 2024-05-21 DIAGNOSIS — N39.0 URINARY TRACT INFECTION WITHOUT HEMATURIA, SITE UNSPECIFIED: ICD-10-CM

## 2024-05-21 LAB
BILIRUB UR QL STRIP: NEGATIVE
GLUCOSE UR QL STRIP: NEGATIVE
KETONES UR QL STRIP: POSITIVE
LEUKOCYTE ESTERASE UR QL STRIP: POSITIVE
PH, POC UA: 7 (ref 5–8)
POC BLOOD, URINE: NEGATIVE
POC NITRATES, URINE: NEGATIVE
PROT UR QL STRIP: POSITIVE
SP GR UR STRIP: 1.02 (ref 1–1.03)
UROBILINOGEN UR STRIP-ACNC: 1 (ref 0.1–1.1)

## 2024-05-21 PROCEDURE — 81003 URINALYSIS AUTO W/O SCOPE: CPT | Mod: QW,S$GLB,, | Performed by: NURSE PRACTITIONER

## 2024-05-21 PROCEDURE — 87086 URINE CULTURE/COLONY COUNT: CPT | Performed by: NURSE PRACTITIONER

## 2024-05-21 PROCEDURE — 99214 OFFICE O/P EST MOD 30 MIN: CPT | Mod: S$GLB,,, | Performed by: NURSE PRACTITIONER

## 2024-05-21 RX ORDER — CEPHALEXIN 500 MG/1
500 CAPSULE ORAL EVERY 12 HOURS
Qty: 20 CAPSULE | Refills: 0 | Status: SHIPPED | OUTPATIENT
Start: 2024-05-21 | End: 2024-05-31

## 2024-05-21 NOTE — PATIENT INSTRUCTIONS
Increase fluids  Medication as directed  Go to emergency department for fever, nausea, back pain or fever  Strict follow up with primary care 2 days

## 2024-05-21 NOTE — PROGRESS NOTES
"Subjective:      Patient ID: Milagros Bello is a 33 y.o. female.    Vitals:  height is 5' 2" (1.575 m) and weight is 93.9 kg (207 lb). Her oral temperature is 98.1 °F (36.7 °C). Her blood pressure is 109/73 and her pulse is 85. Her respiration is 19 and oxygen saturation is 99%.     Chief Complaint: Dysuria    Pt states she's nine weeks pregnant    Dysuria   This is a new problem. The current episode started today. The problem occurs every urination. The problem has been gradually worsening. The quality of the pain is described as burning. The pain is at a severity of 8/10. The pain is mild. There has been no fever. She is Sexually active. There is No history of pyelonephritis. Associated symptoms include frequency, a possible pregnancy and urgency. Pertinent negatives include no behavior changes, chills, discharge, flank pain, hematuria, hesitancy, nausea, sweats, vomiting, weight loss, bubble bath use, constipation, rash or withholding. She has tried nothing for the symptoms. The treatment provided no relief. There is no history of catheterization, diabetes insipidus, diabetes mellitus, genitourinary reflux, hypertension, kidney stones, recurrent UTIs, a single kidney, STD, urinary stasis or a urological procedure.       Constitution: Negative for chills.   HENT: Negative.     Respiratory: Negative.     Gastrointestinal:  Negative for nausea, vomiting and constipation.   Genitourinary:  Positive for dysuria, frequency and urgency. Negative for flank pain, hematuria, vaginal discharge, vaginal bleeding and genital sore.   Musculoskeletal:  Negative for back pain and muscle ache.   Skin:  Negative for rash.      Objective:     Physical Exam   Constitutional:  Non-toxic appearance. No distress. normal  HENT:   Head: Normocephalic.   Ears:   Right Ear: Tympanic membrane normal.   Left Ear: Tympanic membrane normal.   Nose: Nose normal.   Cardiovascular: Normal rate.   Pulmonary/Chest: Effort normal and breath sounds " normal.   Abdominal: Normal appearance.   Neurological: no focal deficit. She is alert and at baseline.   Skin: Capillary refill takes less than 2 seconds.   Nursing note and vitals reviewed.    Assessment:     1. Dysuria during pregnancy in first trimester    2. Urinary tract infection without hematuria, site unspecified      Results for orders placed or performed in visit on 05/21/24   POCT Urinalysis, Dipstick, Automated, W/O Scope   Result Value Ref Range    POC Blood, Urine Negative Negative    POC Bilirubin, Urine Negative Negative    POC Urobilinogen, Urine 1.0 0.1 - 1.1    POC Ketones, Urine Positive (A) Negative    POC Protein, Urine Positive (A) Negative    POC Nitrates, Urine Negative Negative    POC Glucose, Urine Negative Negative    pH, UA 7.0 5 - 8    POC Specific Gravity, Urine 1.025 1.003 - 1.029    POC Leukocytes, Urine Positive (A) Negative       Plan:       Dysuria during pregnancy in first trimester  -     POCT Urinalysis, Dipstick, Automated, W/O Scope    Urinary tract infection without hematuria, site unspecified  -     CULTURE, URINE

## 2024-05-21 NOTE — LETTER
May 27, 2024  Milagros Bello  [address] [facility] [address]                [facility] [place]  [address] [place] [address] A  [facility] [address]  Phone: [phone]  Fax: [phone] Dear Milagros Bello;       We have been trying to get in touch with you over the past several days with very important information..  We have the results of your recent lab studies.  Please call us at your earliest convenience to discuss these results.  Thank you.                                                        Sincerely,                                                          Gasper Fields MD            - w/ agitation  - seems to be FAST Stage 6? No official staging outpt noted  - hold home nanzeric in the setting of orthostatic hypotension

## 2024-05-23 LAB
BACTERIA UR CULT: NORMAL
BACTERIA UR CULT: NORMAL

## 2024-05-24 ENCOUNTER — TELEPHONE (OUTPATIENT)
Dept: URGENT CARE | Facility: CLINIC | Age: 34
End: 2024-05-24
Payer: MEDICAID

## 2024-05-27 ENCOUNTER — TELEPHONE (OUTPATIENT)
Dept: URGENT CARE | Facility: CLINIC | Age: 34
End: 2024-05-27
Payer: MEDICAID

## 2024-05-27 NOTE — TELEPHONE ENCOUNTER
Attempted to call the pt. Pt number no longer in service and portal access is pending. Clinic will mail a note for the pt to contact the clinic regarding urine culture results.         ----- Message from Bruno Staley MD sent at 5/24/2024 10:47 AM CDT -----  Please document in phone call encounter   · Whether you Spoke with patient  · Whether Patient reports - improvement/worsening/no change of symptoms  · Action - let them know results came back showing     Your results indicate an infection in your urine, but it may not require treatment due to the presence of multiple organisms signifying contamination. Here's a summary of the key points:    Infection in Urine:  The urine culture showed an infection, but the low level of bacteria, the type of bacteria,  or the presence of multiple organisms suggests contamination and may not pose a significant risk requiring treatment.  Follow-Up Care:  It's important to follow up with your primary care provider or healthcare professional if you have any concerns or questions about your health. They can provide further guidance and clarification based on your specific situation.  Understanding the Results:  Urine cultures with a CFU of less than 50,000 may not necessarily require treatment as they might not cause symptoms or complications in some cases.  Seeking Clarification:  If you're unsure about the implications of the results or if you experience any symptoms or concerns, it's crucial to consult your healthcare provider for personalized advice and recommendations.         If they have any concerns or questions about their health, let them know it's important to follow up with their primary care provider or healthcare professional for further guidance and clarification.

## 2024-06-18 PROBLEM — O02.1 MISSED ABORTION: Status: ACTIVE | Noted: 2024-06-18

## 2024-08-09 ENCOUNTER — HOSPITAL ENCOUNTER (EMERGENCY)
Facility: HOSPITAL | Age: 34
Discharge: HOME | End: 2024-08-09
Attending: SURGERY
Payer: MEDICAID

## 2024-08-09 VITALS
OXYGEN SATURATION: 100 % | BODY MASS INDEX: 39.13 KG/M2 | WEIGHT: 212.63 LBS | DIASTOLIC BLOOD PRESSURE: 75 MMHG | RESPIRATION RATE: 20 BRPM | HEIGHT: 62 IN | TEMPERATURE: 98 F | SYSTOLIC BLOOD PRESSURE: 130 MMHG | HEART RATE: 87 BPM

## 2024-08-09 DIAGNOSIS — L03.90 CELLULITIS, UNSPECIFIED CELLULITIS SITE: Primary | ICD-10-CM

## 2024-08-09 PROCEDURE — 99284 EMERGENCY DEPT VISIT MOD MDM: CPT

## 2024-08-09 PROCEDURE — 25000003 PHARM REV CODE 250: Performed by: SURGERY

## 2024-08-09 RX ORDER — SULFAMETHOXAZOLE AND TRIMETHOPRIM 800; 160 MG/1; MG/1
1 TABLET ORAL
Status: COMPLETED | OUTPATIENT
Start: 2024-08-09 | End: 2024-08-09

## 2024-08-09 RX ORDER — SULFAMETHOXAZOLE AND TRIMETHOPRIM 800; 160 MG/1; MG/1
1 TABLET ORAL 2 TIMES DAILY
Qty: 20 TABLET | Refills: 0 | Status: SHIPPED | OUTPATIENT
Start: 2024-08-09 | End: 2024-08-19

## 2024-08-09 RX ORDER — MUPIROCIN 20 MG/G
OINTMENT TOPICAL 3 TIMES DAILY
Qty: 15 G | Refills: 0 | Status: SHIPPED | OUTPATIENT
Start: 2024-08-09 | End: 2024-08-19

## 2024-08-09 RX ADMIN — SULFAMETHOXAZOLE AND TRIMETHOPRIM 1 TABLET: 800; 160 TABLET ORAL at 06:08

## 2024-08-09 NOTE — ED PROVIDER NOTES
Encounter Date: 8/9/2024       History     Chief Complaint   Patient presents with    Abscess     History of Present Illness  Milagros Bello is a 34 y.o. female that presents with cellulitis on the chin  Patient with no signs of abscess, patient no signs of drainage on ED exam  Patient has had no previous history of MRSA infections on ER interview  Patient has no signs of cellulitic spread, no fever or concerning symptoms    The history is provided by the patient.     Review of patient's allergies indicates:  No Known Allergies  Past Medical History:   Diagnosis Date    Anxiety     Depression     Headache     History of psychiatric hospitalization     [facility] 6/2020    Hx of psychiatric care     lexapro prescribed by PCP    Panic disorder     Psychiatric exam requested by authority     Psychiatric problem     PTSD (post-traumatic stress disorder)     Sleep difficulties     Suicide attempt     inconsistent reports - overdose last year reported then denied     Therapy     states was seeing a counselor prior to COVID crisis but could not see with the crisis     Past Surgical History:   Procedure Laterality Date    DILATION AND CURETTAGE OF UTERUS USING SUCTION N/A 6/18/2024    Procedure: DILATION AND CURETTAGE, UTERUS, USING SUCTION;  Surgeon: Janes Carrillo MD;  Location: [facility] OR;  Service: OB/GYN;  Laterality: N/A;     Family History   Problem Relation Name Age of Onset    Diabetes Father      Heart failure Father       Social History     Tobacco Use    Smoking status: Never     Passive exposure: Never    Smokeless tobacco: Never   Substance Use Topics    Alcohol use: Yes     Comment: 2-3 weekly or every other week    Drug use: Never     Review of Systems   Constitutional: Negative.    HENT: Negative.     Eyes: Negative.    Respiratory: Negative.     Cardiovascular: Negative.    Gastrointestinal: Negative.    Genitourinary:  Negative for dysuria, urgency and vaginal discharge.   Skin:  Positive for  wound.   Neurological: Negative.    Psychiatric/Behavioral: Negative.     All other systems reviewed and are negative.      Physical Exam     Initial Vitals [08/09/24 1839]   BP Pulse Resp Temp SpO2   129/71 94 20 98.2 °F (36.8 °C) 99 %      MAP       --         Physical Exam    Nursing note and vitals reviewed.  Constitutional: Vital signs are normal. She appears well-developed and well-nourished. She is cooperative.   HENT:   Head: Normocephalic and atraumatic.   Eyes: Conjunctivae, EOM and lids are normal. Pupils are equal, round, and reactive to light.   Neck: Trachea normal and phonation normal. Neck supple. No JVD present.   Normal range of motion.   Full passive range of motion without pain.     Cardiovascular:  Normal rate, regular rhythm, S1 normal, S2 normal, normal heart sounds, intact distal pulses and normal pulses.           Pulmonary/Chest: Effort normal and breath sounds normal.   Abdominal: Abdomen is soft and flat. Bowel sounds are normal.   Musculoskeletal:         General: Normal range of motion.      Cervical back: Full passive range of motion without pain, normal range of motion and neck supple.     Neurological: She is alert and oriented to person, place, and time. She has normal strength.   Skin: Skin is intact. Capillary refill takes less than 2 seconds.   (+) indurated hair follicles & chin cellulitis         ED Course   Procedures  Labs Reviewed - No data to display       Imaging Results    None          Medications   sulfamethoxazole-trimethoprim 800-160mg per tablet 1 tablet (has no administration in time range)     Medical Decision Making  Differential includes cellulitis, abscess, carbuncle, furuncle, MRSA infection    Problems Addressed:  Cellulitis, unspecified cellulitis site: complicated acute illness or injury    ED Management & Risks of Complication, Morbidity, & Mortality:  Start patient on Bactrim, clean 3 times a day with Bactroban after  Pt/Family counseled to return to the  ER with any concerns on DC    Need for Hospitalization or Surgery with Social Determinants of Health:  This patient does not need to be hospitalized on ER evaluation today  The patient's diagnosis is not limited by social determinants of health  Does not require surgery or procedure (major or minor), no risk factors    Clinical Impression:  Final diagnoses:  [L03.90] Cellulitis, unspecified cellulitis site (Primary)          ED Disposition Condition    Discharge Stable          ED Prescriptions       Medication Sig Dispense Start Date End Date Auth. Provider    mupirocin (BACTROBAN) 2 % ointment Apply topically 3 (three) times daily. for 10 days 15 g 8/9/2024 8/19/2024 Jonel Phelps MD    sulfamethoxazole-trimethoprim 800-160mg (BACTRIM DS) 800-160 mg Tab Take 1 tablet by mouth 2 (two) times daily. for 10 days 20 tablet 8/9/2024 8/19/2024 Jonel Phelps MD          Follow-up Information       Follow up With Specialties Details Why Contact [facility]    Clinic, [facility]  Schedule an appointment as soon as possible for a visit in 2 days  [address] [facility] [place] [id number] [phone]               Jonel Phelps MD  08/09/24 6373

## 2024-08-12 ENCOUNTER — HOSPITAL ENCOUNTER (EMERGENCY)
Facility: HOSPITAL | Age: 34
Discharge: HOME | End: 2024-08-12
Attending: EMERGENCY MEDICINE
Payer: MEDICAID

## 2024-08-12 VITALS
OXYGEN SATURATION: 97 % | HEIGHT: 62 IN | HEART RATE: 91 BPM | WEIGHT: 211.88 LBS | DIASTOLIC BLOOD PRESSURE: 64 MMHG | SYSTOLIC BLOOD PRESSURE: 119 MMHG | BODY MASS INDEX: 38.99 KG/M2 | RESPIRATION RATE: 18 BRPM | TEMPERATURE: 99 F

## 2024-08-12 DIAGNOSIS — L02.91 ABSCESS: ICD-10-CM

## 2024-08-12 DIAGNOSIS — L03.90 CELLULITIS, UNSPECIFIED CELLULITIS SITE: Primary | ICD-10-CM

## 2024-08-12 PROCEDURE — 99284 EMERGENCY DEPT VISIT MOD MDM: CPT | Mod: 25

## 2024-08-12 PROCEDURE — 25000003 PHARM REV CODE 250: Performed by: EMERGENCY MEDICINE

## 2024-08-12 PROCEDURE — 10061 I&D ABSCESS COMP/MULTIPLE: CPT

## 2024-08-12 RX ORDER — HYDROCODONE BITARTRATE AND ACETAMINOPHEN 5; 325 MG/1; MG/1
1 TABLET ORAL EVERY 8 HOURS PRN
Qty: 12 TABLET | Refills: 0 | Status: SHIPPED | OUTPATIENT
Start: 2024-08-12 | End: 2024-08-12

## 2024-08-12 RX ORDER — LIDOCAINE HYDROCHLORIDE 10 MG/ML
5 INJECTION, SOLUTION EPIDURAL; INFILTRATION; INTRACAUDAL; PERINEURAL
Status: COMPLETED | OUTPATIENT
Start: 2024-08-12 | End: 2024-08-12

## 2024-08-12 RX ORDER — HYDROCODONE BITARTRATE AND ACETAMINOPHEN 5; 325 MG/1; MG/1
1 TABLET ORAL EVERY 8 HOURS PRN
Qty: 12 TABLET | Refills: 0 | Status: SHIPPED | OUTPATIENT
Start: 2024-08-12 | End: 2024-08-15

## 2024-08-12 RX ORDER — CHLORHEXIDINE GLUCONATE 40 MG/ML
SOLUTION TOPICAL DAILY PRN
Qty: 118 ML | Refills: 0 | Status: SHIPPED | OUTPATIENT
Start: 2024-08-12

## 2024-08-12 RX ORDER — DOXYCYCLINE 100 MG/1
100 CAPSULE ORAL 2 TIMES DAILY
Qty: 20 CAPSULE | Refills: 0 | Status: SHIPPED | OUTPATIENT
Start: 2024-08-12 | End: 2024-08-22

## 2024-08-12 RX ADMIN — LIDOCAINE HYDROCHLORIDE 50 MG: 10 INJECTION, SOLUTION EPIDURAL; INFILTRATION; INTRACAUDAL; PERINEURAL at 11:08

## 2024-08-13 NOTE — ED PROVIDER NOTES
Encounter Date: 8/12/2024       History     Chief Complaint   Patient presents with    Abscess     PT TO ER WITH C/O ABSCESS TO CHIN. PT STATES SHE WAS SEEN RECENTLY AND DIAGNOSED WITH STAPH INFECTION. PT REPORTS PAIN IS GETTING WORSE AND SHE HAD A FEVER .4 LAST NIGHT     HPI  Patient is a 34-year-old white female past medical history of psychiatric disorder presenting with worsening pain, swelling and redness to the chin for the past 3 days.  She has been compliant with the Bactrim that was prescribed.  ,No Drainage, fever, or chills.  Review of patient's allergies indicates:  No Known Allergies  Past Medical History:   Diagnosis Date    Anxiety     Depression     Headache     [facility]    Hx of psychiatric care     lexapro prescribed by PCP    Panic disorder     Psychiatric exam requested by authority     Psychiatric problem     PTSD (post-traumatic stress disorder)     Sleep difficulties     Suicide attempt     inconsistent reports - overdose last year reported then denied     Therapy     states was seeing a counselor prior to COVID crisis but could not see with the crisis     Past Surgical History:   Procedure Laterality Date    DILATION AND CURETTAGE OF UTERUS USING SUCTION N/A 6/18/2024    Procedure: DILATION AND CURETTAGE, UTERUS, USING SUCTION;  Surgeon: Janes Carrillo MD;  Location: [facility] OR;  Service: OB/GYN;  Laterality: N/A;     Family History   Problem Relation Name Age of Onset    Diabetes Father      Heart failure Father       Social History     Tobacco Use    Smoking status: Never     Passive exposure: Never    Smokeless tobacco: Never   Substance Use Topics    Alcohol use: Yes     Comment: 2-3 weekly or every other week    Drug use: Never     Review of Systems   Constitutional:  Negative for chills and fever.   Gastrointestinal:  Negative for abdominal pain.   Skin:  Positive for color change.   Neurological:  Negative for weakness.    All other systems reviewed and are negative.    Social Determinants of Health     Tobacco Use: Low Risk  (8/12/2024)    Patient History     Smoking Tobacco Use: Never     Smokeless Tobacco Use: Never     Passive Exposure: Never   Alcohol Use: Not At Risk (10/5/2020)    Received from [facility]    AUDIT-C     Frequency of Alcohol Consumption: Never     Average Number of Drinks: Not on file     Frequency of Binge Drinking: Never   Financial Resource Strain: Not on file   Food Insecurity: Not on file   Transportation Needs: Not on file   Physical Activity: Not on file   Stress: Not on file   Housing Stability: Not on file   Depression: Not on file   Utilities: Not on file   Health Literacy: Not on file   Social Isolation: Not on file       Physical Exam     Initial Vitals [08/12/24 1106]   BP Pulse Resp Temp SpO2   119/64 91 18 98.7 °F (37.1 °C) 97 %      MAP       --         Physical Exam    Nursing note and vitals reviewed.  Constitutional: She appears well-developed and well-nourished.   HENT:   Head: Normocephalic and atraumatic.   Mouth/Throat: Oropharynx is clear and moist.   Eyes: EOM are normal. Pupils are equal, round, and reactive to light.   Neck: Neck supple. No JVD present.   Normal range of motion.  Cardiovascular:  Normal rate and intact distal pulses.           Pulmonary/Chest: Breath sounds normal.   Abdominal: Abdomen is soft.   Musculoskeletal:         General: Normal range of motion.      Cervical back: Normal range of motion and neck supple.     Neurological: She is alert and oriented to person, place, and time. GCS score is 15. GCS eye subscore is 4. GCS verbal subscore is 5. GCS motor subscore is 6.   Skin: Skin is warm.   Cellulitis with induration to the chin         ED Course   I & D - Incision and Drainage    Date/Time: 8/13/2024 11:56 AM  Location procedure was performed: [facility] EMERGENCY DEPARTMENT    Performed by: Rosalee Noonan MD  Authorized by: Rosalee Noonan MD  Pre-operative  diagnosis: abscess  Post-operative diagnosis: abscess  Consent Done: Yes  Consent: Verbal consent obtained.  Consent given by: patient  Patient understanding: patient states understanding of the procedure being performed  Patient consent: the patient's understanding of the procedure matches consent given  Procedure consent: procedure consent does not match procedure scheduled  Relevant documents: relevant documents not present or verified  Test results: test results not available  Site marked: the operative site was not marked  Imaging studies: imaging studies not available  Patient identity confirmed:  and name  Type: abscess  Body area: head/neck  Location details: face  Anesthesia: local infiltration    Anesthesia:  Local Anesthetic: lidocaine 1% without epinephrine  Anesthetic total: 7 mL    Patient sedated: no  Description of findings: purulent drainage   Scalpel size: 11  Incision type: elliptical  Incision depth: fascia  Complexity: complex  Drainage: pus and serosanguinous  Drainage amount: scant  Wound treatment: incision, wound left open, expression of material and deloculation  Complications: No  Specimens: No  Implants: No  Patient tolerance: Patient tolerated the procedure well with no immediate complications    Incision depth: fascia        Labs Reviewed - No data to display       Imaging Results    None          Medications   LIDOcaine (PF) 10 mg/ml (1%) injection 50 mg (50 mg Infiltration Given by Provider 24 1693)     Medical Decision Making    This is an emergent evaluation of a 34y WF presenting with abscess ot the chin.  Exam she is non toxic, afebrile with abscess and surrounding cellulitis to the chin.  I&D without complications.   Doxycycline added to antibiotic reg imine.  Given return precautions.    DDX: cellulitis, abscess, soft tissue infection     Risk  OTC drugs.  Prescription drug management.                                      Clinical Impression:  Final diagnoses:  [L03.90]  Cellulitis, unspecified cellulitis site (Primary)  [L02.91] Abscess          ED Disposition Condition    Discharge Stable          ED Prescriptions       Medication Sig Dispense Start Date End Date Auth. Provider    doxycycline (VIBRAMYCIN) 100 MG Cap Take 1 capsule (100 mg total) by mouth 2 (two) times daily. for 10 days 20 capsule 8/12/2024 8/22/2024 Rosalee Noonan MD    chlorhexidine (HIBICLENS) 4 % external liquid Apply topically daily as needed. 118 mL 8/12/2024 -- Rosalee Noonan MD    HYDROcodone-acetaminophen (NORCO) 5-325 mg per tablet  (Status: Discontinued) Take 1 tablet by mouth every 8 (eight) hours as needed for Pain. 12 tablet 8/12/2024 8/12/2024 Rosalee Noonan MD    HYDROcodone-acetaminophen (NORCO) 5-325 mg per tablet Take 1 tablet by mouth every 8 (eight) hours as needed for Pain. 12 tablet 8/12/2024 8/15/2024 Rosalee Noonan MD          Follow-up Information       Follow up With Specialties Details Why Contact Info    Unit, [facility]  Schedule an appointment as soon as possible for a visit in 1 day  [address] [facility] [address] [place] [address]  [phone]               Rosalee Noonan MD  08/13/24 6512

## 2025-03-27 ENCOUNTER — OFFICE VISIT (OUTPATIENT)
Dept: URGENT CARE | Facility: CLINIC | Age: 35
End: 2025-03-27
Payer: MEDICAID

## 2025-03-27 VITALS
DIASTOLIC BLOOD PRESSURE: 72 MMHG | SYSTOLIC BLOOD PRESSURE: 105 MMHG | BODY MASS INDEX: 38.94 KG/M2 | OXYGEN SATURATION: 98 % | WEIGHT: 211.63 LBS | HEIGHT: 62 IN | RESPIRATION RATE: 18 BRPM | TEMPERATURE: 99 F | HEART RATE: 89 BPM

## 2025-03-27 DIAGNOSIS — L08.9 STAPH SKIN INFECTION: Primary | ICD-10-CM

## 2025-03-27 DIAGNOSIS — B95.8 STAPH SKIN INFECTION: Primary | ICD-10-CM

## 2025-03-27 PROCEDURE — 99213 OFFICE O/P EST LOW 20 MIN: CPT | Mod: S$GLB,,, | Performed by: PHYSICIAN ASSISTANT

## 2025-03-27 RX ORDER — MUPIROCIN 20 MG/G
OINTMENT TOPICAL 3 TIMES DAILY
Qty: 30 G | Refills: 0 | Status: SHIPPED | OUTPATIENT
Start: 2025-03-27 | End: 2025-04-06

## 2025-03-27 RX ORDER — SULFAMETHOXAZOLE AND TRIMETHOPRIM 800; 160 MG/1; MG/1
1 TABLET ORAL 2 TIMES DAILY
Qty: 20 TABLET | Refills: 0 | Status: SHIPPED | OUTPATIENT
Start: 2025-03-27 | End: 2025-04-06

## 2025-03-27 NOTE — PROGRESS NOTES
"Subjective:      Patient ID: Milagros Bello is a 34 y.o. female.    Vitals:  height is 5' 2" (1.575 m) and weight is 96 kg (211 lb 10.3 oz). Her oral temperature is 98.6 °F (37 °C). Her blood pressure is 105/72 and her pulse is 89. Her respiration is 18 and oxygen saturation is 98%.     Chief Complaint: Rash    Patient complaining of a bump on top of left eye lid. Pt said she noticed it Monday.     Rash  This is a new problem. Episode onset: Monday. The affected locations include the face. The rash is characterized by blistering, pain, scaling and swelling. It is unknown if there was an exposure to a precipitant. Pertinent negatives include no congestion, cough, eye pain, joint pain or sore throat. Past treatments include antibiotic cream. The treatment provided no relief.       HENT:  Negative for congestion and sore throat.    Eyes:  Negative for eye pain.   Respiratory:  Negative for cough.    Skin:  Positive for rash.      Objective:     Physical Exam   Constitutional: She is oriented to person, place, and time. She appears well-developed. She is cooperative.  Non-toxic appearance. She does not appear ill. No distress.   HENT:   Head: Normocephalic and atraumatic.   Ears:   Right Ear: Hearing normal.   Left Ear: Hearing normal.   Eyes: Conjunctivae and lids are normal. No scleral icterus.       Neck: Phonation normal. Neck supple.   Cardiovascular: Normal rate.   Pulmonary/Chest: Effort normal. No respiratory distress.   Abdominal: Normal appearance.   Neurological: She is alert and oriented to person, place, and time. Coordination normal.   Skin: Skin is intact, not diaphoretic and not pale.   Psychiatric: Her speech is normal and behavior is normal. Judgment and thought content normal.   Nursing note and vitals reviewed.      Assessment:     1. Staph skin infection        Plan:       Staph skin infection  -     mupirocin (BACTROBAN) 2 % ointment; Apply topically 3 (three) times daily. for 10 days  Dispense: " 30 g; Refill: 0  -     sulfamethoxazole-trimethoprim 800-160mg (BACTRIM DS) 800-160 mg Tab; Take 1 tablet by mouth 2 (two) times daily. for 10 days  Dispense: 20 tablet; Refill: 0      Warm compresses to affected area. Alternate ibuprofen and tylenol as needed for fever/pain/body aches every 4-6 hours. Rest, increase PO fluids.   Discussed with patient the importance of f/u with their primary care provider. Urged to go to the ER for any worsening signs or symptoms.

## 2025-03-29 ENCOUNTER — OFFICE VISIT (OUTPATIENT)
Dept: URGENT CARE | Facility: CLINIC | Age: 35
End: 2025-03-29
Payer: MEDICAID

## 2025-03-29 VITALS
RESPIRATION RATE: 18 BRPM | HEIGHT: 62 IN | DIASTOLIC BLOOD PRESSURE: 83 MMHG | HEART RATE: 83 BPM | TEMPERATURE: 99 F | OXYGEN SATURATION: 98 % | WEIGHT: 211 LBS | SYSTOLIC BLOOD PRESSURE: 125 MMHG | BODY MASS INDEX: 38.83 KG/M2

## 2025-03-29 DIAGNOSIS — B95.8 STAPH INFECTION: ICD-10-CM

## 2025-03-29 DIAGNOSIS — L02.91 ABSCESS: Primary | ICD-10-CM

## 2025-03-29 PROCEDURE — 99213 OFFICE O/P EST LOW 20 MIN: CPT | Mod: S$GLB,,, | Performed by: NURSE PRACTITIONER

## 2025-03-29 NOTE — PROGRESS NOTES
"Subjective:      Patient ID: Milagros Bello is a 34 y.o. female.    Vitals:  height is 5' 2" (1.575 m) and weight is 95.7 kg (211 lb). Her oral temperature is 99.4 °F (37.4 °C). Her blood pressure is 125/83 and her pulse is 83. Her respiration is 18 and oxygen saturation is 98%.     Chief Complaint: sore    Other  This is a new problem. Episode onset: last monday. The problem occurs constantly. The problem has been gradually worsening. Associated symptoms include headaches. Pertinent negatives include no fever, nausea or vomiting. Associated symptoms comments: Left upper eyelid swelling, bloody white discharge, pain is a 9  . Treatments tried: bactrim, bactroban. The treatment provided no relief.       Constitution: Negative for fever.   Gastrointestinal:  Negative for nausea and vomiting.   Neurological:  Positive for headaches.      Objective:     Physical Exam   Constitutional: normal  Neurological: She is alert.   Nursing note and vitals reviewed.      Assessment:     1. Abscess    2. Staph infection        Plan:       Abscess    Staph infection                    "

## 2025-03-29 NOTE — PATIENT INSTRUCTIONS
Continue bactrim and bactroban  Warm compresses frequently   Go to the emergency department if symptoms worsen

## 2025-05-01 ENCOUNTER — ON-DEMAND VIRTUAL (OUTPATIENT)
Dept: URGENT CARE | Facility: CLINIC | Age: 35
End: 2025-05-01
Payer: MEDICAID

## 2025-05-01 DIAGNOSIS — L08.9 SKIN INFECTION: Primary | ICD-10-CM

## 2025-05-01 RX ORDER — SULFAMETHOXAZOLE AND TRIMETHOPRIM 800; 160 MG/1; MG/1
1 TABLET ORAL 2 TIMES DAILY
Qty: 14 TABLET | Refills: 0 | Status: SHIPPED | OUTPATIENT
Start: 2025-05-01 | End: 2025-05-08

## 2025-05-01 NOTE — PROGRESS NOTES
Subjective:      Patient ID: Milagros Bello is a 34 y.o. female.    Vitals:  vitals were not taken for this visit.     Chief Complaint: Cellulitis      Visit Type: TELE AUDIOVISUAL - This visit was conducted virtually based on  subjective information and limited objective exam    Present with the patient at the time of consultation: TELEMED PRESENT WITH PATIENT: None  LOCATION OF PATIENT [facility]  Two patient identifiers used to verify patient- saying out date of birth and full name.       Past Medical History:   Diagnosis Date    Anxiety     Depression     Headache     History of psychiatric hospitalization     [facility] 6/2020    Hx of psychiatric care     lexapro prescribed by PCP    Panic disorder     Psychiatric exam requested by authority     Psychiatric problem     PTSD (post-traumatic stress disorder)     Sleep difficulties     Suicide attempt     inconsistent reports - overdose last year reported then denied     Therapy     states was seeing a counselor prior to COVID crisis but could not see with the crisis     Past Surgical History:   Procedure Laterality Date    DILATION AND CURETTAGE OF UTERUS USING SUCTION N/A 6/18/2024    Procedure: DILATION AND CURETTAGE, UTERUS, USING SUCTION;  Surgeon: Janes Carrillo MD;  Location: [facility] OR;  Service: OB/GYN;  Laterality: N/A;     Review of patient's allergies indicates:  No Known Allergies  Medications Ordered Prior to Encounter[1]  Family History   Problem Relation Name Age of Onset    Diabetes Father      Heart failure Father             [facility] Peq [facility]vv Intake    5/1/2025  5:05 PM CDT - Filed by Patient   What is your current physical address in the event of a medical emergency? [address]   Are you able to take your vital signs? No   Please attach any relevant images or files    Is your employer contracted with [facility]? No         33 yo female with c/o abscess to chin area. She noticed yesterday. She states it started as a  bump and now with a pustule. She denies picking or squeezing at it. She denies drainage.         Skin:  Positive for erythema.        Objective:   The physical exam was conducted virtually.    AAO x 3 ; no acute distress noted; appearance normal; mood and behavior normal; thought process normal  Head- normocephalic  Nose- appears normal, no discharge or erythema  Eyes- pupils appear normal in size, no drainage, no erythema  Ears- normal appearing; no discharge, no erythema  Mouth- appears normal  Oropharynx- no erythema, lesions  Lungs- breathing at a normal rate, no acute distress noted  Heart- no reports of tachycardia, palpitations, chest pain  Abdomen- non distended, non tender reported by patient  Skin- warm and dry, no erythema or edema noted by patient or visualized  Psych- as above; no si/hi      Assessment:     No diagnosis found.    Plan:     Warm compresses.  Bactrim ds bid x 7 days    Thank you for choosing [facility]!    Our goal in the [facility] is to always provide outstanding medical care. You may receive a survey by mail or e-mail in the next week regarding your experience today. We would greatly appreciate you completing and returning the survey. Your feedback provides us with a way to recognize our staff who provide very good care, and it helps us learn how to improve when your experience was below our aspiration of excellence.         We appreciate you trusting us with your medical care. We hope you feel better soon. We will be happy to take care of you for all of your future medical needs.    You must understand that you've received an Urgent Care treatment only and that you may be released before all your medical problems are known or treated. You, the patient, will arrange for follow up care as instructed.    Follow up with your PCP or specialty clinic as directed in the next 1-2 weeks if not improved or as needed.  You can call [phone] to schedule  an appointment with the appropriate provider.    If your condition worsens we recommend that you receive another evaluation in person, with your primary care provider, urgent care or at the emergency room immediately or contact your primary medical clinics after hours call service to discuss your concerns.         There are no diagnoses linked to this encounter.                       [1]   Current Outpatient Medications on File Prior to Visit   Medication Sig Dispense Refill    ARIPiprazole (ABILIFY) 5 MG Tab Take 5 mg by mouth once daily.      chlorhexidine (HIBICLENS) 4 % external liquid Apply topically daily as needed. 118 mL 0    ibuprofen (ADVIL,MOTRIN) 800 MG tablet Take 1 tablet (800 mg total) by mouth 3 (three) times daily. 40 tablet 0    ibuprofen (ADVIL,MOTRIN) 800 MG tablet Take 1 tablet (800 mg total) by mouth 3 (three) times daily. 40 tablet 0    oxyCODONE-acetaminophen (PERCOCET) 5-325 mg per tablet Take 1 tablet by mouth every 4 (four) hours as needed for Pain. 20 tablet 0    oxyCODONE-acetaminophen (PERCOCET) 5-325 mg per tablet Take 1 tablet by mouth every 4 (four) hours as needed for Pain. 28 tablet 0    sertraline (ZOLOFT) 50 MG tablet Take 1 tablet (50 mg total) by mouth once daily. 30 tablet 2     No current facility-administered medications on file prior to visit.

## 2025-06-26 ENCOUNTER — OFFICE VISIT (OUTPATIENT)
Dept: URGENT CARE | Facility: CLINIC | Age: 35
End: 2025-06-26
Payer: MEDICAID

## 2025-06-26 VITALS
BODY MASS INDEX: 36.6 KG/M2 | OXYGEN SATURATION: 98 % | TEMPERATURE: 98 F | RESPIRATION RATE: 17 BRPM | HEART RATE: 88 BPM | HEIGHT: 62 IN | DIASTOLIC BLOOD PRESSURE: 73 MMHG | WEIGHT: 198.88 LBS | SYSTOLIC BLOOD PRESSURE: 104 MMHG

## 2025-06-26 DIAGNOSIS — L02.424 BOIL OF LEFT ELBOW: Primary | ICD-10-CM

## 2025-06-26 PROCEDURE — 99213 OFFICE O/P EST LOW 20 MIN: CPT | Mod: S$GLB,,, | Performed by: NURSE PRACTITIONER

## 2025-06-26 RX ORDER — MUPIROCIN 20 MG/G
OINTMENT TOPICAL
Qty: 22 G | Refills: 1 | Status: SHIPPED | OUTPATIENT
Start: 2025-06-26

## 2025-06-26 RX ORDER — SULFAMETHOXAZOLE AND TRIMETHOPRIM 800; 160 MG/1; MG/1
1 TABLET ORAL 2 TIMES DAILY
Qty: 14 TABLET | Refills: 0 | Status: SHIPPED | OUTPATIENT
Start: 2025-06-26 | End: 2025-07-03

## 2025-06-26 NOTE — PROGRESS NOTES
"Subjective:      Patient ID: Milagros Bello is a 34 y.o. female.    Vitals:  height is 5' 2" (1.575 m) and weight is 90.2 kg (198 lb 13.7 oz). Her oral temperature is 98.4 °F (36.9 °C). Her blood pressure is 104/73 and her pulse is 88. Her respiration is 17 and oxygen saturation is 98%.     Chief Complaint: Mass    Patient has a bump on her arm by her elbow. Pt thinks she might have a staph infection. Here several times in the past with recurrent infections/cellulitis and boils. States she is washing/bathing with antibacterial soap but not helping the recurrence.     Abscess  Chronicity:  NewProgression Since Onset: rapidly worsening  Size:  3-5cm  Location:  Shoulder/arm  Associated Symptoms: no fever, no chills  Characteristics: painful, redness and swelling    Relieved by:  Nothing      Constitution: Negative for chills and fever.   Skin:  Positive for abscess.      Objective:     Physical Exam   Constitutional: She is oriented to person, place, and time.  Non-toxic appearance. No distress.   HENT:   Head: Atraumatic.   Ears:   Right Ear: External ear normal.   Left Ear: External ear normal.   Mouth/Throat: Mucous membranes are moist.   Eyes: Conjunctivae are normal. No scleral icterus.   Neck: Neck supple.   Cardiovascular: Normal rate.   Pulmonary/Chest: Effort normal.   Neurological: She is alert and oriented to person, place, and time.   Skin: Skin is warm, dry and not diaphoretic.        Psychiatric: Her behavior is normal. Mood, judgment and thought content normal.       Assessment:     1. Boil of left elbow        Plan:       Boil of left elbow  -     sulfamethoxazole-trimethoprim 800-160mg (BACTRIM DS) 800-160 mg Tab; Take 1 tablet by mouth 2 (two) times daily. for 7 days  Dispense: 14 tablet; Refill: 0  -     mupirocin (BACTROBAN) 2 % ointment; Apply to affected area 3 times daily  Dispense: 22 g; Refill: 1                    "